# Patient Record
Sex: FEMALE | Employment: FULL TIME | URBAN - METROPOLITAN AREA
[De-identification: names, ages, dates, MRNs, and addresses within clinical notes are randomized per-mention and may not be internally consistent; named-entity substitution may affect disease eponyms.]

---

## 2022-05-18 VITALS — DIASTOLIC BLOOD PRESSURE: 60 MMHG | WEIGHT: 111 LBS | BODY MASS INDEX: 21.68 KG/M2 | SYSTOLIC BLOOD PRESSURE: 102 MMHG

## 2022-05-24 ENCOUNTER — ROUTINE PRENATAL (OUTPATIENT)
Dept: OBGYN CLINIC | Age: 32
End: 2022-05-24

## 2022-05-24 VITALS — WEIGHT: 118 LBS | BODY MASS INDEX: 23.05 KG/M2 | DIASTOLIC BLOOD PRESSURE: 70 MMHG | SYSTOLIC BLOOD PRESSURE: 102 MMHG

## 2022-05-24 DIAGNOSIS — Z34.03 PRIMIGRAVIDA IN THIRD TRIMESTER: Primary | ICD-10-CM

## 2022-05-24 PROCEDURE — 0502F SUBSEQUENT PRENATAL CARE: CPT | Performed by: OBSTETRICS & GYNECOLOGY

## 2022-06-10 ENCOUNTER — ROUTINE PRENATAL (OUTPATIENT)
Dept: OBGYN CLINIC | Age: 32
End: 2022-06-10
Payer: COMMERCIAL

## 2022-06-10 VITALS — SYSTOLIC BLOOD PRESSURE: 104 MMHG | BODY MASS INDEX: 23.63 KG/M2 | WEIGHT: 121 LBS | DIASTOLIC BLOOD PRESSURE: 60 MMHG

## 2022-06-10 DIAGNOSIS — Z34.93 ENCOUNTER FOR SUPERVISION OF LOW-RISK PREGNANCY IN THIRD TRIMESTER: ICD-10-CM

## 2022-06-10 PROCEDURE — 99213 OFFICE O/P EST LOW 20 MIN: CPT | Performed by: OBSTETRICS & GYNECOLOGY

## 2022-06-10 NOTE — PROGRESS NOTES
Elijah Chapin 1284   Denies NICOLÁS, COURTNEY, Ctxs. Good FM.       Vitals:    06/10/22 0946   BP: 104/60        Encounter for supervision of low-risk pregnancy in third trimester  Kick counts and labor precautions reviewed          Daphnie Parra DO

## 2022-06-20 ENCOUNTER — ROUTINE PRENATAL (OUTPATIENT)
Dept: OBGYN CLINIC | Age: 32
End: 2022-06-20

## 2022-06-20 VITALS — SYSTOLIC BLOOD PRESSURE: 118 MMHG | WEIGHT: 121 LBS | DIASTOLIC BLOOD PRESSURE: 60 MMHG | BODY MASS INDEX: 23.63 KG/M2

## 2022-06-20 DIAGNOSIS — Z34.93 ENCOUNTER FOR SUPERVISION OF LOW-RISK PREGNANCY IN THIRD TRIMESTER: Primary | ICD-10-CM

## 2022-06-20 PROCEDURE — 99902 PR PRENATAL VISIT: CPT | Performed by: OBSTETRICS & GYNECOLOGY

## 2022-07-06 ENCOUNTER — ROUTINE PRENATAL (OUTPATIENT)
Dept: OBGYN CLINIC | Age: 32
End: 2022-07-06

## 2022-07-06 VITALS — DIASTOLIC BLOOD PRESSURE: 70 MMHG | BODY MASS INDEX: 24.8 KG/M2 | WEIGHT: 127 LBS | SYSTOLIC BLOOD PRESSURE: 118 MMHG

## 2022-07-06 DIAGNOSIS — Z36.85 SCREENING, ANTENATAL, FOR STREPTOCOCCUS B: ICD-10-CM

## 2022-07-06 DIAGNOSIS — Z34.93 ENCOUNTER FOR SUPERVISION OF LOW-RISK PREGNANCY IN THIRD TRIMESTER: Primary | ICD-10-CM

## 2022-07-06 LAB — GBS, EXTERNAL RESULT: NEGATIVE

## 2022-07-06 PROCEDURE — 99902 PR PRENATAL VISIT: CPT | Performed by: OBSTETRICS & GYNECOLOGY

## 2022-07-06 NOTE — PROGRESS NOTES
HUI. . 36w4d   Denies LOF, VB, Ctxs. Good FM.       CBB pt    GBS today  SVE: pt defers   ceph by roldan       Tdap reminder-- pt DECLINES   Counseled

## 2022-07-11 LAB
BACTERIA SPEC CULT: NORMAL
SERVICE CMNT-IMP: NORMAL

## 2022-07-14 ENCOUNTER — ROUTINE PRENATAL (OUTPATIENT)
Dept: OBGYN CLINIC | Age: 32
End: 2022-07-14

## 2022-07-14 VITALS — WEIGHT: 128 LBS | DIASTOLIC BLOOD PRESSURE: 78 MMHG | SYSTOLIC BLOOD PRESSURE: 118 MMHG | BODY MASS INDEX: 25 KG/M2

## 2022-07-14 DIAGNOSIS — Z34.93 ENCOUNTER FOR SUPERVISION OF LOW-RISK PREGNANCY IN THIRD TRIMESTER: Primary | ICD-10-CM

## 2022-07-14 PROCEDURE — 99902 PR PRENATAL VISIT: CPT | Performed by: OBSTETRICS & GYNECOLOGY

## 2022-07-14 RX ORDER — DIAPER,BRIEF,INFANT-TODD,DISP
EACH MISCELLANEOUS
Qty: 30 G | Refills: 1 | Status: SHIPPED | OUTPATIENT
Start: 2022-07-14 | End: 2022-07-21

## 2022-07-14 NOTE — PROGRESS NOTES
30yo G1 at 37w5d for HUI:    +FM, no VB, no LOF, no persistent ctx    GBS neg    C/o hemorrhoids    Vitals:    07/14/22 1334   BP: 118/78       +++FHTs    Palpates cephalic. Routine counseling discussed. Kick counts reviewed. Labor precautions and labor preferences reviewed. RTC 1wk.

## 2022-07-20 ENCOUNTER — ROUTINE PRENATAL (OUTPATIENT)
Dept: OBGYN CLINIC | Age: 32
End: 2022-07-20

## 2022-07-20 VITALS — SYSTOLIC BLOOD PRESSURE: 122 MMHG | WEIGHT: 131 LBS | DIASTOLIC BLOOD PRESSURE: 80 MMHG | BODY MASS INDEX: 25.58 KG/M2

## 2022-07-20 DIAGNOSIS — Z34.93 ENCOUNTER FOR SUPERVISION OF LOW-RISK PREGNANCY IN THIRD TRIMESTER: Primary | ICD-10-CM

## 2022-07-20 PROCEDURE — 99902 PR PRENATAL VISIT: CPT | Performed by: OBSTETRICS & GYNECOLOGY

## 2022-07-27 ENCOUNTER — ROUTINE PRENATAL (OUTPATIENT)
Dept: OBGYN CLINIC | Age: 32
End: 2022-07-27

## 2022-07-27 VITALS — BODY MASS INDEX: 26.37 KG/M2 | WEIGHT: 135 LBS | DIASTOLIC BLOOD PRESSURE: 78 MMHG | SYSTOLIC BLOOD PRESSURE: 122 MMHG

## 2022-07-27 DIAGNOSIS — Z34.93 ENCOUNTER FOR SUPERVISION OF LOW-RISK PREGNANCY IN THIRD TRIMESTER: Primary | ICD-10-CM

## 2022-07-27 PROCEDURE — 99902 PR PRENATAL VISIT: CPT | Performed by: OBSTETRICS & GYNECOLOGY

## 2022-07-28 ENCOUNTER — CLINICAL DOCUMENTATION (OUTPATIENT)
Dept: OBGYN CLINIC | Age: 32
End: 2022-07-28

## 2022-07-28 NOTE — PROGRESS NOTES
S/LILIYA Cuellar at [facility] L&D to schedule pt's IOL with  8/1/22 with cytotec 7/31/22. [employer] message sent to pt.

## 2022-07-31 ENCOUNTER — HOSPITAL ENCOUNTER (INPATIENT)
Age: 32
LOS: 3 days | Discharge: HOME | End: 2022-08-03
Attending: OBSTETRICS & GYNECOLOGY | Admitting: OBSTETRICS & GYNECOLOGY
Payer: COMMERCIAL

## 2022-07-31 PROBLEM — Z34.90 ENCOUNTER FOR INDUCTION OF LABOR: Status: ACTIVE | Noted: 2022-07-31

## 2022-07-31 LAB
ABO + RH BLD: NORMAL
BASOPHILS # BLD: 0 K/UL (ref 0–0.2)
BASOPHILS NFR BLD: 0 % (ref 0–2)
BLOOD GROUP ANTIBODIES SERPL: NORMAL
DIFFERENTIAL METHOD BLD: ABNORMAL
EOSINOPHIL # BLD: 0.1 K/UL (ref 0–0.8)
EOSINOPHIL NFR BLD: 0 % (ref 0.5–7.8)
ERYTHROCYTE [DISTWIDTH] IN BLOOD BY AUTOMATED COUNT: 13.1 % (ref 11.9–14.6)
HCT VFR BLD AUTO: 34.1 % (ref 35.8–46.3)
HGB BLD-MCNC: 11.6 G/DL (ref 11.7–15.4)
IMM GRANULOCYTES # BLD AUTO: 0.1 K/UL (ref 0–0.5)
IMM GRANULOCYTES NFR BLD AUTO: 1 % (ref 0–5)
LYMPHOCYTES # BLD: 1.2 K/UL (ref 0.5–4.6)
LYMPHOCYTES NFR BLD: 8 % (ref 13–44)
MCH RBC QN AUTO: 31.2 PG (ref 26.1–32.9)
MCHC RBC AUTO-ENTMCNC: 34 G/DL (ref 31.4–35)
MCV RBC AUTO: 91.7 FL (ref 79.6–97.8)
MONOCYTES # BLD: 0.8 K/UL (ref 0.1–1.3)
MONOCYTES NFR BLD: 6 % (ref 4–12)
NEUTS SEG # BLD: 12.1 K/UL (ref 1.7–8.2)
NEUTS SEG NFR BLD: 85 % (ref 43–78)
NRBC # BLD: 0 K/UL (ref 0–0.2)
PLATELET # BLD AUTO: 168 K/UL (ref 150–450)
PMV BLD AUTO: 11.7 FL (ref 9.4–12.3)
RBC # BLD AUTO: 3.72 M/UL (ref 4.05–5.2)
SPECIMEN EXP DATE BLD: NORMAL
WBC # BLD AUTO: 14.2 K/UL (ref 4.3–11.1)

## 2022-07-31 PROCEDURE — 1100000000 HC RM PRIVATE

## 2022-07-31 PROCEDURE — 3E033VJ INTRODUCTION OF OTHER HORMONE INTO PERIPHERAL VEIN, PERCUTANEOUS APPROACH: ICD-10-PCS | Performed by: OBSTETRICS & GYNECOLOGY

## 2022-07-31 PROCEDURE — 86901 BLOOD TYPING SEROLOGIC RH(D): CPT

## 2022-07-31 PROCEDURE — 6370000000 HC RX 637 (ALT 250 FOR IP): Performed by: OBSTETRICS & GYNECOLOGY

## 2022-07-31 PROCEDURE — 85025 COMPLETE CBC W/AUTO DIFF WBC: CPT

## 2022-07-31 RX ORDER — ONDANSETRON 2 MG/ML
4 INJECTION INTRAMUSCULAR; INTRAVENOUS EVERY 6 HOURS PRN
Status: DISCONTINUED | OUTPATIENT
Start: 2022-07-31 | End: 2022-08-01 | Stop reason: SDUPTHER

## 2022-07-31 RX ORDER — ACETAMINOPHEN 500 MG
1000 TABLET ORAL EVERY 6 HOURS PRN
Status: DISCONTINUED | OUTPATIENT
Start: 2022-07-31 | End: 2022-08-01 | Stop reason: SDUPTHER

## 2022-07-31 RX ORDER — SODIUM CHLORIDE 9 MG/ML
25 INJECTION, SOLUTION INTRAVENOUS PRN
Status: DISCONTINUED | OUTPATIENT
Start: 2022-07-31 | End: 2022-08-01 | Stop reason: SDUPTHER

## 2022-07-31 RX ORDER — MISOPROSTOL 200 UG/1
800 TABLET ORAL PRN
Status: DISCONTINUED | OUTPATIENT
Start: 2022-07-31 | End: 2022-08-03 | Stop reason: HOSPADM

## 2022-07-31 RX ORDER — TRANEXAMIC ACID 10 MG/ML
1000 INJECTION, SOLUTION INTRAVENOUS
Status: ACTIVE | OUTPATIENT
Start: 2022-07-31 | End: 2022-07-31

## 2022-07-31 RX ORDER — TERBUTALINE SULFATE 1 MG/ML
0.25 INJECTION, SOLUTION SUBCUTANEOUS ONCE
Status: DISCONTINUED | OUTPATIENT
Start: 2022-07-31 | End: 2022-08-03 | Stop reason: ALTCHOICE

## 2022-07-31 RX ORDER — SODIUM CHLORIDE 0.9 % (FLUSH) 0.9 %
5-40 SYRINGE (ML) INJECTION PRN
Status: DISCONTINUED | OUTPATIENT
Start: 2022-07-31 | End: 2022-08-01 | Stop reason: SDUPTHER

## 2022-07-31 RX ORDER — ZOLPIDEM TARTRATE 5 MG/1
5 TABLET ORAL NIGHTLY PRN
Status: DISCONTINUED | OUTPATIENT
Start: 2022-07-31 | End: 2022-08-03 | Stop reason: HOSPADM

## 2022-07-31 RX ORDER — CARBOPROST TROMETHAMINE 250 UG/ML
250 INJECTION, SOLUTION INTRAMUSCULAR PRN
Status: DISCONTINUED | OUTPATIENT
Start: 2022-07-31 | End: 2022-08-03 | Stop reason: HOSPADM

## 2022-07-31 RX ORDER — CLINDAMYCIN PHOSPHATE 900 MG/50ML
900 INJECTION INTRAVENOUS EVERY 8 HOURS
Status: CANCELLED | OUTPATIENT
Start: 2022-07-31

## 2022-07-31 RX ORDER — SODIUM CHLORIDE 0.9 % (FLUSH) 0.9 %
5-40 SYRINGE (ML) INJECTION EVERY 12 HOURS SCHEDULED
Status: DISCONTINUED | OUTPATIENT
Start: 2022-07-31 | End: 2022-08-01 | Stop reason: SDUPTHER

## 2022-07-31 RX ORDER — SODIUM CHLORIDE, SODIUM LACTATE, POTASSIUM CHLORIDE, CALCIUM CHLORIDE 600; 310; 30; 20 MG/100ML; MG/100ML; MG/100ML; MG/100ML
INJECTION, SOLUTION INTRAVENOUS CONTINUOUS
Status: DISCONTINUED | OUTPATIENT
Start: 2022-07-31 | End: 2022-08-01 | Stop reason: SDUPTHER

## 2022-07-31 RX ORDER — SODIUM CHLORIDE, SODIUM LACTATE, POTASSIUM CHLORIDE, AND CALCIUM CHLORIDE .6; .31; .03; .02 G/100ML; G/100ML; G/100ML; G/100ML
500 INJECTION, SOLUTION INTRAVENOUS PRN
Status: DISCONTINUED | OUTPATIENT
Start: 2022-07-31 | End: 2022-08-01 | Stop reason: SDUPTHER

## 2022-07-31 RX ORDER — METHYLERGONOVINE MALEATE 0.2 MG/ML
200 INJECTION INTRAVENOUS PRN
Status: DISCONTINUED | OUTPATIENT
Start: 2022-07-31 | End: 2022-08-03 | Stop reason: HOSPADM

## 2022-07-31 RX ORDER — SODIUM CHLORIDE, SODIUM LACTATE, POTASSIUM CHLORIDE, AND CALCIUM CHLORIDE .6; .31; .03; .02 G/100ML; G/100ML; G/100ML; G/100ML
1000 INJECTION, SOLUTION INTRAVENOUS PRN
Status: DISCONTINUED | OUTPATIENT
Start: 2022-07-31 | End: 2022-08-01 | Stop reason: SDUPTHER

## 2022-07-31 RX ADMIN — Medication 50 MCG: at 20:20

## 2022-07-31 ASSESSMENT — PATIENT HEALTH QUESTIONNAIRE - PHQ9
DEPRESSION UNABLE TO ASSESS: YES
SUM OF ALL RESPONSES TO PHQ9 QUESTIONS 1 & 2: 0
1. LITTLE INTEREST OR PLEASURE IN DOING THINGS: NOT AT ALL
2. FEELING DOWN, DEPRESSED OR HOPELESS: NOT AT ALL

## 2022-08-01 ENCOUNTER — ANESTHESIA (OUTPATIENT)
Dept: LABOR AND DELIVERY | Age: 32
End: 2022-08-01
Payer: COMMERCIAL

## 2022-08-01 ENCOUNTER — ANESTHESIA EVENT (OUTPATIENT)
Dept: LABOR AND DELIVERY | Age: 32
End: 2022-08-01
Payer: COMMERCIAL

## 2022-08-01 LAB
BASE DEFICIT BLD-SCNC: 6 MMOL/L
BASE DEFICIT BLD-SCNC: 6.1 MMOL/L
HCO3 BLD-SCNC: 21.8 MMOL/L (ref 22–26)
HCO3 BLDV-SCNC: 19.3 MMOL/L (ref 23–28)
PCO2 BLDCO: 37 MMHG (ref 32–68)
PCO2 BLDCO: 51 MMHG (ref 32–68)
PH BLDCO: 7.24 [PH] (ref 7.15–7.38)
PH BLDCO: 7.32 [PH] (ref 7.15–7.38)
PO2 BLDCO: 19 MMHG
PO2 BLDCO: 31 MMHG
SAO2 % BLD: 22 % (ref 95–98)
SAO2 % BLDV: 54 % (ref 65–88)
SERVICE CMNT-IMP: ABNORMAL
SERVICE CMNT-IMP: ABNORMAL
SPECIMEN TYPE: ABNORMAL
SPECIMEN TYPE: ABNORMAL

## 2022-08-01 PROCEDURE — 00HU33Z INSERTION OF INFUSION DEVICE INTO SPINAL CANAL, PERCUTANEOUS APPROACH: ICD-10-PCS | Performed by: ANESTHESIOLOGY

## 2022-08-01 PROCEDURE — 6360000002 HC RX W HCPCS: Performed by: ANESTHESIOLOGY

## 2022-08-01 PROCEDURE — 59400 OBSTETRICAL CARE: CPT | Performed by: OBSTETRICS & GYNECOLOGY

## 2022-08-01 PROCEDURE — 2500000003 HC RX 250 WO HCPCS: Performed by: ANESTHESIOLOGY

## 2022-08-01 PROCEDURE — 7210000100 HC LABOR FEE PER 1 HR

## 2022-08-01 PROCEDURE — 6370000000 HC RX 637 (ALT 250 FOR IP): Performed by: OBSTETRICS & GYNECOLOGY

## 2022-08-01 PROCEDURE — 6360000002 HC RX W HCPCS: Performed by: NURSE ANESTHETIST, CERTIFIED REGISTERED

## 2022-08-01 PROCEDURE — 2580000003 HC RX 258: Performed by: OBSTETRICS & GYNECOLOGY

## 2022-08-01 PROCEDURE — 6360000002 HC RX W HCPCS: Performed by: OBSTETRICS & GYNECOLOGY

## 2022-08-01 PROCEDURE — 7220000101 HC DELIVERY VAGINAL/SINGLE

## 2022-08-01 PROCEDURE — 1100000000 HC RM PRIVATE

## 2022-08-01 PROCEDURE — 3700000025 EPIDURAL BLOCK: Performed by: ANESTHESIOLOGY

## 2022-08-01 PROCEDURE — 7100000010 HC PHASE II RECOVERY - FIRST 15 MIN

## 2022-08-01 PROCEDURE — 51701 INSERT BLADDER CATHETER: CPT

## 2022-08-01 PROCEDURE — 7100000011 HC PHASE II RECOVERY - ADDTL 15 MIN

## 2022-08-01 PROCEDURE — 36600 WITHDRAWAL OF ARTERIAL BLOOD: CPT

## 2022-08-01 PROCEDURE — 82803 BLOOD GASES ANY COMBINATION: CPT

## 2022-08-01 PROCEDURE — 4A1HXCZ MONITORING OF PRODUCTS OF CONCEPTION, CARDIAC RATE, EXTERNAL APPROACH: ICD-10-PCS | Performed by: OBSTETRICS & GYNECOLOGY

## 2022-08-01 RX ORDER — SODIUM CHLORIDE, SODIUM LACTATE, POTASSIUM CHLORIDE, CALCIUM CHLORIDE 600; 310; 30; 20 MG/100ML; MG/100ML; MG/100ML; MG/100ML
INJECTION, SOLUTION INTRAVENOUS CONTINUOUS
Status: DISCONTINUED | OUTPATIENT
Start: 2022-08-01 | End: 2022-08-03 | Stop reason: ALTCHOICE

## 2022-08-01 RX ORDER — DOCUSATE SODIUM 100 MG/1
100 CAPSULE, LIQUID FILLED ORAL 2 TIMES DAILY
Status: DISCONTINUED | OUTPATIENT
Start: 2022-08-01 | End: 2022-08-03 | Stop reason: HOSPADM

## 2022-08-01 RX ORDER — SODIUM CHLORIDE 0.9 % (FLUSH) 0.9 %
5-40 SYRINGE (ML) INJECTION EVERY 12 HOURS SCHEDULED
Status: DISCONTINUED | OUTPATIENT
Start: 2022-08-01 | End: 2022-08-01 | Stop reason: SDUPTHER

## 2022-08-01 RX ORDER — FENTANYL CITRATE 50 UG/ML
INJECTION, SOLUTION INTRAMUSCULAR; INTRAVENOUS PRN
Status: DISCONTINUED | OUTPATIENT
Start: 2022-08-01 | End: 2022-08-01 | Stop reason: SDUPTHER

## 2022-08-01 RX ORDER — ROPIVACAINE HYDROCHLORIDE 5 MG/ML
INJECTION, SOLUTION EPIDURAL; INFILTRATION; PERINEURAL PRN
Status: DISCONTINUED | OUTPATIENT
Start: 2022-08-01 | End: 2022-08-01 | Stop reason: SDUPTHER

## 2022-08-01 RX ORDER — SODIUM CHLORIDE 0.9 % (FLUSH) 0.9 %
5-40 SYRINGE (ML) INJECTION PRN
Status: DISCONTINUED | OUTPATIENT
Start: 2022-08-01 | End: 2022-08-01 | Stop reason: SDUPTHER

## 2022-08-01 RX ORDER — POLYETHYLENE GLYCOL 3350 17 G/17G
17 POWDER, FOR SOLUTION ORAL DAILY
Status: DISCONTINUED | OUTPATIENT
Start: 2022-08-02 | End: 2022-08-03 | Stop reason: HOSPADM

## 2022-08-01 RX ORDER — LIDOCAINE HYDROCHLORIDE 10 MG/ML
1 INJECTION, SOLUTION INFILTRATION; PERINEURAL
Status: DISCONTINUED | OUTPATIENT
Start: 2022-08-01 | End: 2022-08-01 | Stop reason: HOSPADM

## 2022-08-01 RX ORDER — SODIUM CHLORIDE 0.9 % (FLUSH) 0.9 %
5-40 SYRINGE (ML) INJECTION EVERY 12 HOURS SCHEDULED
Status: DISCONTINUED | OUTPATIENT
Start: 2022-08-01 | End: 2022-08-03 | Stop reason: ALTCHOICE

## 2022-08-01 RX ORDER — HYDROCORTISONE ACETATE PRAMOXINE HCL 2.5; 1 G/100G; G/100G
CREAM TOPICAL
Status: DISCONTINUED | OUTPATIENT
Start: 2022-08-01 | End: 2022-08-03 | Stop reason: HOSPADM

## 2022-08-01 RX ORDER — SIMETHICONE 80 MG
80 TABLET,CHEWABLE ORAL EVERY 6 HOURS PRN
Status: DISCONTINUED | OUTPATIENT
Start: 2022-08-01 | End: 2022-08-03 | Stop reason: HOSPADM

## 2022-08-01 RX ORDER — FAMOTIDINE 20 MG/1
20 TABLET, FILM COATED ORAL ONCE
Status: DISCONTINUED | OUTPATIENT
Start: 2022-08-01 | End: 2022-08-01 | Stop reason: HOSPADM

## 2022-08-01 RX ORDER — OXYCODONE HYDROCHLORIDE 5 MG/1
5 TABLET ORAL EVERY 4 HOURS PRN
Status: DISCONTINUED | OUTPATIENT
Start: 2022-08-01 | End: 2022-08-03 | Stop reason: HOSPADM

## 2022-08-01 RX ORDER — OXYCODONE HYDROCHLORIDE 5 MG/1
10 TABLET ORAL EVERY 4 HOURS PRN
Status: DISCONTINUED | OUTPATIENT
Start: 2022-08-01 | End: 2022-08-03 | Stop reason: HOSPADM

## 2022-08-01 RX ORDER — SODIUM CHLORIDE 9 MG/ML
INJECTION, SOLUTION INTRAVENOUS PRN
Status: DISCONTINUED | OUTPATIENT
Start: 2022-08-01 | End: 2022-08-01 | Stop reason: SDUPTHER

## 2022-08-01 RX ORDER — LIDOCAINE HYDROCHLORIDE AND EPINEPHRINE 15; 5 MG/ML; UG/ML
INJECTION, SOLUTION EPIDURAL PRN
Status: DISCONTINUED | OUTPATIENT
Start: 2022-08-01 | End: 2022-08-01 | Stop reason: SDUPTHER

## 2022-08-01 RX ORDER — IBUPROFEN 800 MG/1
800 TABLET ORAL EVERY 6 HOURS
Status: DISCONTINUED | OUTPATIENT
Start: 2022-08-01 | End: 2022-08-03 | Stop reason: HOSPADM

## 2022-08-01 RX ORDER — ONDANSETRON 8 MG/1
8 TABLET, ORALLY DISINTEGRATING ORAL EVERY 8 HOURS PRN
Status: DISCONTINUED | OUTPATIENT
Start: 2022-08-01 | End: 2022-08-03 | Stop reason: HOSPADM

## 2022-08-01 RX ORDER — MAGNESIUM CARB/ALUMINUM HYDROX 105-160MG
120 TABLET,CHEWABLE ORAL DAILY PRN
Status: DISCONTINUED | OUTPATIENT
Start: 2022-08-01 | End: 2022-08-03 | Stop reason: ALTCHOICE

## 2022-08-01 RX ORDER — ACETAMINOPHEN 500 MG
1000 TABLET ORAL EVERY 6 HOURS PRN
Status: DISCONTINUED | OUTPATIENT
Start: 2022-08-02 | End: 2022-08-03 | Stop reason: HOSPADM

## 2022-08-01 RX ORDER — SODIUM CHLORIDE 0.9 % (FLUSH) 0.9 %
5-40 SYRINGE (ML) INJECTION PRN
Status: DISCONTINUED | OUTPATIENT
Start: 2022-08-01 | End: 2022-08-03 | Stop reason: ALTCHOICE

## 2022-08-01 RX ORDER — SODIUM CHLORIDE, SODIUM LACTATE, POTASSIUM CHLORIDE, CALCIUM CHLORIDE 600; 310; 30; 20 MG/100ML; MG/100ML; MG/100ML; MG/100ML
INJECTION, SOLUTION INTRAVENOUS CONTINUOUS
Status: DISCONTINUED | OUTPATIENT
Start: 2022-08-01 | End: 2022-08-01 | Stop reason: HOSPADM

## 2022-08-01 RX ORDER — LANOLIN
CREAM (ML) TOPICAL PRN
Status: DISCONTINUED | OUTPATIENT
Start: 2022-08-01 | End: 2022-08-03 | Stop reason: HOSPADM

## 2022-08-01 RX ORDER — SODIUM CHLORIDE 9 MG/ML
INJECTION, SOLUTION INTRAVENOUS PRN
Status: DISCONTINUED | OUTPATIENT
Start: 2022-08-01 | End: 2022-08-03 | Stop reason: ALTCHOICE

## 2022-08-01 RX ORDER — ROPIVACAINE HYDROCHLORIDE 2 MG/ML
INJECTION, SOLUTION EPIDURAL; INFILTRATION; PERINEURAL PRN
Status: DISCONTINUED | OUTPATIENT
Start: 2022-08-01 | End: 2022-08-01 | Stop reason: SDUPTHER

## 2022-08-01 RX ADMIN — Medication 87.3 MILLI-UNITS/MIN: at 18:53

## 2022-08-01 RX ADMIN — SODIUM CHLORIDE, POTASSIUM CHLORIDE, SODIUM LACTATE AND CALCIUM CHLORIDE 1000 ML: 600; 310; 30; 20 INJECTION, SOLUTION INTRAVENOUS at 07:33

## 2022-08-01 RX ADMIN — ROPIVACAINE HYDROCHLORIDE 4 ML: 2 INJECTION, SOLUTION EPIDURAL; INFILTRATION at 08:17

## 2022-08-01 RX ADMIN — Medication 120 ML: at 18:37

## 2022-08-01 RX ADMIN — ROPIVACAINE HYDROCHLORIDE 8 ML/HR: 2 INJECTION, SOLUTION EPIDURAL; INFILTRATION at 08:18

## 2022-08-01 RX ADMIN — ACETAMINOPHEN 1000 MG: 500 TABLET, FILM COATED ORAL at 13:21

## 2022-08-01 RX ADMIN — IBUPROFEN 800 MG: 800 TABLET, FILM COATED ORAL at 23:45

## 2022-08-01 RX ADMIN — Medication 166.7 ML: at 18:42

## 2022-08-01 RX ADMIN — HYDROCORTISONE ACETATE PRAMOXINE HCL: 2.5; 1 CREAM TOPICAL at 23:45

## 2022-08-01 RX ADMIN — DOCUSATE SODIUM 100 MG: 100 CAPSULE ORAL at 23:45

## 2022-08-01 RX ADMIN — Medication 87.3 MILLI-UNITS/MIN: at 19:10

## 2022-08-01 RX ADMIN — ACETAMINOPHEN 1000 MG: 500 TABLET, FILM COATED ORAL at 19:29

## 2022-08-01 RX ADMIN — FENTANYL CITRATE 100 MCG: 50 INJECTION INTRAMUSCULAR; INTRAVENOUS at 13:29

## 2022-08-01 RX ADMIN — SODIUM CHLORIDE, PRESERVATIVE FREE 10 ML: 5 INJECTION INTRAVENOUS at 04:15

## 2022-08-01 RX ADMIN — SODIUM CHLORIDE, POTASSIUM CHLORIDE, SODIUM LACTATE AND CALCIUM CHLORIDE 500 ML: 600; 310; 30; 20 INJECTION, SOLUTION INTRAVENOUS at 08:13

## 2022-08-01 RX ADMIN — ROPIVACAINE HYDROCHLORIDE 5 ML: 5 INJECTION, SOLUTION EPIDURAL; INFILTRATION; PERINEURAL at 13:32

## 2022-08-01 RX ADMIN — ROPIVACAINE HYDROCHLORIDE 4 ML: 2 INJECTION, SOLUTION EPIDURAL; INFILTRATION at 08:15

## 2022-08-01 RX ADMIN — Medication 2 MILLI-UNITS/MIN: at 08:44

## 2022-08-01 RX ADMIN — LIDOCAINE HYDROCHLORIDE,EPINEPHRINE BITARTRATE 3.5 ML: 15; .005 INJECTION, SOLUTION EPIDURAL; INFILTRATION; INTRACAUDAL; PERINEURAL at 08:12

## 2022-08-01 RX ADMIN — ONDANSETRON 4 MG: 2 INJECTION INTRAMUSCULAR; INTRAVENOUS at 04:15

## 2022-08-01 RX ADMIN — BUTORPHANOL TARTRATE 1 MG: 2 INJECTION, SOLUTION INTRAMUSCULAR; INTRAVENOUS at 07:33

## 2022-08-01 RX ADMIN — Medication: at 23:46

## 2022-08-01 RX ADMIN — WITCH HAZEL 40 EACH: 500 SOLUTION RECTAL; TOPICAL at 23:45

## 2022-08-01 RX ADMIN — BUTORPHANOL TARTRATE 1 MG: 2 INJECTION, SOLUTION INTRAMUSCULAR; INTRAVENOUS at 04:13

## 2022-08-01 ASSESSMENT — PAIN SCALES - GENERAL
PAINLEVEL_OUTOF10: 7
PAINLEVEL_OUTOF10: 3
PAINLEVEL_OUTOF10: 0
PAINLEVEL_OUTOF10: 2

## 2022-08-01 ASSESSMENT — PAIN DESCRIPTION - DESCRIPTORS
DESCRIPTORS: CRAMPING
DESCRIPTORS: PRESSURE

## 2022-08-01 ASSESSMENT — PAIN DESCRIPTION - LOCATION
LOCATION: PERINEUM
LOCATION: ABDOMEN

## 2022-08-01 ASSESSMENT — PAIN SCALES - WONG BAKER
WONGBAKER_NUMERICALRESPONSE: 0
WONGBAKER_NUMERICALRESPONSE: 0

## 2022-08-01 ASSESSMENT — PAIN DESCRIPTION - ORIENTATION: ORIENTATION: LOWER

## 2022-08-01 NOTE — L&D DELIVERY NOTE
Delivery Note    Obstetrician:  Sonia Sevilla MD    Pre-Delivery Diagnosis: Term pregnancy, Induced labor, Single fetus, and Uncomplicated pregnancy    Post-Delivery Diagnosis: Living  infant(s) and Male    Intrapartum Event: Meconium    Procedure: Spontaneous vaginal delivery    Epidural: Yes    Monitor:  Fetal Heart Tones - External and Uterine Contractions - External    Indications for instrumental delivery: none    Estimated Blood Loss: 250    Episiotomy: R mediolateral (the patient's right)    Laceration(s):  left sulcal    Laceration(s) repair: Yes    Presentation: Cephalic    Fetal Description: pires    Fetal Position: Left Occiput Anterior    BABY INFORMATION  NAME:   Information for the patient's :  Chad Hayden [[id number]]   Baby Boy Suly Ramos   SEX: female  DATE AND TIME OF BIRTH:   Information for the patient's :  Chad Hayden [[id number]]   2024  at   Information for the patient's :  Chad Hayden [[id number]]   [facility]:   Information for the patient's :  Chad Jackelyn [[id number]]     and   Information for the patient's :  Chad Jackelyn [[id number]]    . APGARS:  Information for the patient's :  Chad Hayden [[id number]]       Information for the patient's :  Chad Hayden [[id number]]        Umbilical Cord: 3 vessels present and Cord blood sent to lab for type, Rh, and Sammy' test    Specimens: cord gases were drawn and sent           Complications:  Shoulder dystocia lasting 80 seconds total.           Lab Results   Component Value Date/Time    ABORH B POSITIVE 2022 07:11 PM    ABSCRNEXT Negative 2022 10:25 AM    RUBELLAEXT Immune 2022 10:25 AM            Attending Attestation: I performed the procedure.  Was already scrubbed in room pushing with the patient, NICU and RT called to room for delivery with prolonged pushing Details:            [place] Presentation    Presentation: Vertex  Position: Left  _: Occiput  _: Anterior       Shoulder Dystocia    Shoulder Dystocia Present?: Yes  Anterior Shoulder: Right    Time Recognized: 2022 18:36:00    Time Help Called: 2022 18:36:00   Physician/Provider Arrived: 2022 18:25:00    Team Arrived: 2022 18:30:00   Additional Staff Arrived: 2022 18:36:00   Gentle Attempt at Traction, Assisted by Maternal Expulsive Forces?: Yes  First Maneuver: Head of Bed Down, Delivery of Posterior Arm, Alison Maneuver, Suprapubic Pressure   Time Performed: 2022 18:36:00    Performed By: Dr. Devin Crisostomo       Assisted Delivery Details    Forceps Attempted?: No  Vacuum Extractor Attempted?: No       Document Additional Attempt         Document Additional Attempt                 Cord    Vessels: 3 Vessels  Complications: None  Delayed Cord Clamping?: No  Cord Clamped Date/Time: 2022 18:38:00  Cord Blood Disposition: Lab  Gases Sent?: Yes       Placenta    Date/Time: 2022 18:42:00  Removal: Spontaneous  Appearance: Intact  Disposition: Discarded       Lacerations    Episiotomy: Right Mediolateral  Perineal Lacerations: 2nd  Other Lacerations: sulcus laceration  Sulcus Laceration: left Repaired?: Yes   Number of Repair Packets: 4       Vaginal Counts    Initial Count Personnel: Kamron LOPEZ  Initial Count Verified By: Matty Lin RN    Sponges [place] Instruments   Initial Counts Correct Correct Correct   Final Counts Correct Correct Correct   Final Count Personnel: Bello Oliva  Final Count Verified By: Matty Lin RN  Accurate Final Count?: Yes  If the count is incorrect due to Intentionally Retained Foreign Object (IRFO) add the IRFO LDA in Lines/Drains.   Add LDA: Link to LDA       Blood Loss  Mother: Kameron Yoder #[id number]     Start of Mother's Information      Delivery Blood Loss  22 2020 - 22 1916      Quantitative Blood Loss (mL) Hospital Encounter 250 grams    Total  250 mL               End of Mother's Information  Mother: Kameron Yoder #[id number]                Delivery Providers    Delivering clinician: Talib Welsh MD     Provider Role    Talib Welsh MD Obstetrician    Paty Rubio, RN Primary Nurse    Jose Miguel Gomez RN Primary  Nurse    Mehreen Burgos RN Primary  Nurse    Monse Young MD Neonatologist     Respiratory Therapist (Day)              [place] Assessment    Living Status: Living     Apgar Scoring Key:    0 1 2    Skin Color: Blue or pale Acrocyanotic Completely pink    Heart Rate: Absent <100 bpm >100 bpm    Reflex Irritability: No response Grimace Cry or active withdrawal    Muscle Tone: Limp Some flexion Active motion    Respiratory Effort: Absent Weak cry; hypoventilation Good, crying                      Skin Color:   Heart Rate:   Reflex Irritability:   Muscle Tone:   Respiratory Effort: Total:            1 Minute:    0    1    1    1    2    5        Apgar 1 total from OB History    5 Minute:    1    2    2    2    2    9        Apgar 5 total from OB History    10 Minute:              15 Minute:              20 Minute:                        Apgars Assigned By: DR. XIE              Resuscitation    Method: Bulb Suction, Stimulation              Measurements      Birth Weight: 3880 g Birth Length: 0.52 m     Head Circumference: 0.365 m Chest Circumference: 0.35 m              Title      Skin to Skin Initiation Date/Time: 22 18:58:36 EDT     Skin to Skin With: Mother     Skin to Skin End Date/Time:       Reason Skin to Skin Not Initiated:  Acuity

## 2022-08-01 NOTE — H&P
History & Physical    Name: Chris Hernandez MRN: [id number]  SSN: [id number]    YOB: 1990  Age: 33 y.o. Sex: female      Subjective:     Estimated Date of Delivery: 22  OB History    Para Term  AB Living   1             SAB IAB Ectopic Molar Multiple Live Births                    # Outcome Date GA Lbr Salvador/2nd Weight Sex Delivery Anes PTL Lv   1 Current                Ms. Shalom Carreno is admitted with pregnancy at 40w2d for induction of labor due to SGA fetus at term. Prenatal course was complicated by  close monitoring for IUGR . Please see prenatal records for details. History reviewed. No pertinent past medical history. History reviewed. No pertinent surgical history. Social History     Occupational History    Not on file   Tobacco Use    Smoking status: Never     Passive exposure: Never    Smokeless tobacco: Never   Substance and Sexual Activity    Alcohol use: Never    Drug use: Never    Sexual activity: Not on file     History reviewed. No pertinent family history. Allergies   Allergen Reactions    Strawberry Extract Rash     Prior to Admission medications    Medication Sig Start Date End Date Taking? Authorizing Provider   Prenatal Vit-Fe Fumarate-FA (PRENATAL PO) Take by mouth    Historical Provider, MD        Review of Systems:   Pertinent ROS noted in the HPI.     Objective:     Vitals:  Vitals:    22 0845 22 0859 22 0915 22 0959   BP: (!) 104/57 105/60 (!) 111/58 118/60   Pulse: 96 93 94 (!) 102   Resp:       Temp:       TempSrc:            Physical Exam:  Constitutional: no distress, AAO x3  Heart: RRR  Lungs: CTAB  Abdomen: soft, nontender, gravid  LE: No swelling  SVE: 5/100/0 per RN check @ 0845  Membranes:   SROM'd overnight  Fetal Heart Rate: Reactive          Prenatal Labs:   Lab Results   Component Value Date/Time    ABORH B POSITIVE 2022 07:11 PM    RUBELLAEXT Immune 2022 10:25 AM    HBSAGEXT Negative 2022 10:33 AM HIVEXT Non reactive 03/24/2022 10:33 AM    RPREXT Negative 03/24/2022 10:33 AM       Impression/Plan:     Principal Problem:    Encounter for induction of labor  Resolved Problems:    * No resolved hospital problems. *       Plan: Admit for induction of labor. Group B Strep negative. IOL Counseling:     Factual information regarding the medical condition and the need for induction of labor was discussed with the patient, who verbalized understanding. The therapeutic rationale, benefits, risks and potential complications were discussed, including the possibility of pain, bleeding, infection, loss of function, disfigurement, death or other unforeseen complications. Alternatives to the procedure were discussed (including potential risks, complications and benefits of each). No guarantee was expressed or implied as to the results of the procedure. Informed consent was given, as well as a request to proceed.

## 2022-08-01 NOTE — ANESTHESIA PROCEDURE NOTES
Epidural Block    Patient location during procedure: OB  Start time: 8/1/2022 8:07 AM  End time: 8/1/2022 8:12 AM  Reason for block: labor epidural  Staffing  Performed: anesthesiologist   Anesthesiologist: Sarbjit Stout MD  Epidural  Patient position: sitting  Prep: ChloraPrep and site prepped and draped  Patient monitoring: continuous pulse ox and frequent blood pressure checks  Approach: midline  Location: L3-4  Injection technique: BOGDAN saline  Provider prep: mask and sterile gloves  Needle  Needle type: Tuohy   Needle gauge: 17 G  Needle length: 3.5 in  Needle insertion depth: 6 cm  Catheter type: end hole  Catheter size: 19 G  Catheter at skin depth: 12 cm  Test dose: negativeCatheter Secured: tegaderm and tape  Assessment  Hemodynamics: stable  Attempts: 1  Outcomes: patient tolerated procedure well  Preanesthetic Checklist  Completed: patient identified, IV checked, risks and benefits discussed, equipment checked, pre-op evaluation, timeout performed, anesthesia consent given, oxygen available and monitors applied/VS acknowledged

## 2022-08-01 NOTE — PROGRESS NOTES
notified of SVE= 4/70/-2, SROM at 0530 with [facility] and request for Epidural. Orders received for Epidural

## 2022-08-01 NOTE — ANESTHESIA PRE PROCEDURE
Department of Anesthesiology  Preprocedure Note       Name:  Flakito Oden   Age:  33 y.o.  :  1990                                          MRN:  [id number]         Date:  2022      Surgeon: * No surgeons listed *    Procedure: * No procedures listed *    Medications prior to admission:   Prior to Admission medications    Medication Sig Start Date End Date Taking?  Authorizing Provider   Prenatal Vit-Fe Fumarate-FA (PRENATAL PO) Take by mouth    Historical Provider, MD       Current medications:    Current Facility-Administered Medications   Medication Dose Route Frequency Provider Last Rate Last Admin    lidocaine 1 % injection 1 mL  1 mL IntraDERmal Once PRN CARLOS Crookal LeakMD yoselin        famotidine (PEPCID) tablet 20 mg  20 mg Oral Once L Sancho Leaks, MD        lactated ringers infusion   IntraVENous Continuous CARLOS Sancho LeakMD yoselin        sodium chloride flush 0.9 % injection 5-40 mL  5-40 mL IntraVENous 2 times per day CARLOS Sancho Leaks, MD        sodium chloride flush 0.9 % injection 5-40 mL  5-40 mL IntraVENous PRN CARLOS Sancho LeakMD yoselin        0.9 % sodium chloride infusion   IntraVENous PRN L Sancho Leaks, MD        mineral oil liquid 120 mL  120 mL Topical Daily PRN Shirley Mchugh MD        lactated ringers infusion   IntraVENous Continuous Shirley Mchugh MD        lactated ringers bolus  500 mL IntraVENous PRN Shirley Mchugh .8 mL/hr at 22 0813 500 mL at 22 0813    Or    lactated ringers bolus  1,000 mL IntraVENous PRN Shirley Mchugh MD   Stopped at 22 0801    sodium chloride flush 0.9 % injection 5-40 mL  5-40 mL IntraVENous 2 times per day Shirley cMhugh MD        sodium chloride flush 0.9 % injection 5-40 mL  5-40 mL IntraVENous PRN Shirley Mchugh MD   10 mL at 22 0415    0.9 % sodium chloride infusion  25 mL IntraVENous PRN Shirley Mchugh MD        oxytocin (PITOCIN) 30 units in 500 mL infusion  1-20 griffin-units/min IntraVENous Continuous Shelley Hannah MD   Stopped at 08/01/22 1200    methylergonovine (METHERGINE) injection 200 mcg  200 mcg IntraMUSCular PRN Shelley Hannah MD        carboprost (HEMABATE) injection 250 mcg  250 mcg IntraMUSCular PRN Shelley Hannah MD        miSOPROStol (CYTOTEC) tablet 800 mcg  800 mcg Rectal PRN Shelley Hannah MD        oxytocin (PITOCIN) 30 units in 500 mL infusion  87.3 griffin-units/min IntraVENous Continuous PRN Shelley Hannah MD        And    oxytocin (PITOCIN) 10 unit bolus from the bag  10 Units IntraVENous PRN Shelley Hannah MD        terbutaline (BRETHINE) injection 0.25 mg  0.25 mg SubCUTAneous Once Shelley Hannah MD        ondansetron [facility]) injection 4 mg  4 mg IntraVENous Q6H PRN Shelley Hannah MD   4 mg at 08/01/22 0415    zolpidem (AMBIEN) tablet 5 mg  5 mg Oral Nightly PRN Shelley Hannah MD        acetaminophen (TYLENOL) tablet 1,000 mg  1,000 mg Oral Q6H PRN Shelley Hannah MD         Facility-Administered Medications Ordered in Other Encounters   Medication Dose Route Frequency Provider Last Rate Last Admin    ropivacaine (NAROPIN) 0.2% injection 0.2%   Epidural PRN CARLOS Currie MD   8 mL/hr at 08/01/22 0818    Lidocaine-EPINEPHrine 1.5 %-1:[id number]   Epidural PRN CARLOS Currie MD   3.5 mL at 08/01/22 6375       Allergies: Allergies   Allergen Reactions    Strawberry Extract Rash       Problem List:    Patient Active Problem List   Diagnosis Code    Encounter for supervision of low-risk pregnancy in third trimester Z34.93    Encounter for induction of labor Z34.90       Past Medical History:  History reviewed. No pertinent past medical history. Past Surgical History:  History reviewed. No pertinent surgical history.     Social History:    Social History     Tobacco Use    Smoking status: Never     Passive exposure: Never    Smokeless tobacco: Never   Substance Use Topics    Alcohol use: Never Counseling given: Not Answered      Vital Signs (Current):   Vitals:    08/01/22 1129 08/01/22 1145 08/01/22 1157 08/01/22 1200   BP: (!) 107/59 106/66  119/77   Pulse: 92 90  (!) 102   Resp:       Temp:   99.7 °F (37.6 °C)    TempSrc:   Oral    SpO2:                                                  BP Readings from Last 3 Encounters:   08/01/22 119/77   07/27/22 122/78   07/20/22 122/80       NPO Status:                                                                                 BMI:   Wt Readings from Last 3 Encounters:   07/27/22 135 lb (61.2 kg)   07/20/22 131 lb (59.4 kg)   07/14/22 128 lb (58.1 kg)     There is no height or weight on file to calculate BMI.    CBC:   Lab Results   Component Value Date/Time    WBC 14.2 07/31/2022 07:11 PM    RBC 3.72 07/31/2022 07:11 PM    HGB 11.6 07/31/2022 07:11 PM    HCT 34.1 07/31/2022 07:11 PM    MCV 91.7 07/31/2022 07:11 PM    RDW 13.1 07/31/2022 07:11 PM     07/31/2022 07:11 PM       CMP: No results found for: NA, K, CL, CO2, BUN, CREATININE, GFRAA, AGRATIO, LABGLOM, GLUCOSE, GLU, PROT, CALCIUM, BILITOT, ALKPHOS, AST, ALT    POC Tests: No results for input(s): POCGLU, POCNA, POCK, POCCL, POCBUN, POCHEMO, POCHCT in the last 72 hours.     Coags: No results found for: PROTIME, INR, APTT    HCG (If Applicable): No results found for: PREGTESTUR, PREGSERUM, HCG, HCGQUANT     ABGs: No results found for: PHART, PO2ART, KYE9YHP, TKU7XIL, BEART, G5AWVLHV     Type & Screen (If Applicable):  No results found for: LABABO, LABRH    Drug/Infectious Status (If Applicable):  No results found for: HIV, HEPCAB    COVID-19 Screening (If Applicable): No results found for: COVID19        Anesthesia Evaluation  Patient summary reviewed and Nursing notes reviewed  Airway: Mallampati: II  TM distance: >3 FB   Neck ROM: full  Mouth opening: > = 3 FB   Dental: normal exam         Pulmonary:Negative Pulmonary ROS                              Cardiovascular:Negative CV ROS  Exercise tolerance: good (>4 METS),                     Neuro/Psych:   Negative Neuro/Psych ROS              GI/Hepatic/Renal: Neg GI/Hepatic/Renal ROS            Endo/Other: Negative Endo/Other ROS                    Abdominal:             Vascular: negative vascular ROS. Other Findings:           Anesthesia Plan      epidural     ASA 2             Anesthetic plan and risks discussed with patient and spouse.                         Yasmin Gale MD   8/1/2022

## 2022-08-01 NOTE — PLAN OF CARE
Problem: Vaginal Birth or  Section  Goal: Fetal and maternal status remain reassuring during the birth process  Description:  Birth OB-Pregnancy care plan goal which identifies if the fetal and maternal status remain reassuring during the birth process  Outcome: Progressing     Problem: Pain  Goal: Verbalizes/displays adequate comfort level or baseline comfort level  Outcome: Progressing     Problem: Infection - Adult  Goal: Absence of infection during hospitalization  Outcome: Progressing     Problem: Safety - Adult  Goal: Free from fall injury  Outcome: Progressing     Problem: Chronic Conditions and Co-morbidities  Goal: Patient's chronic conditions and co-morbidity symptoms are monitored and maintained or improved  Outcome: Progressing

## 2022-08-02 PROCEDURE — 1100000000 HC RM PRIVATE

## 2022-08-02 PROCEDURE — 6370000000 HC RX 637 (ALT 250 FOR IP): Performed by: OBSTETRICS & GYNECOLOGY

## 2022-08-02 RX ADMIN — IBUPROFEN 800 MG: 800 TABLET, FILM COATED ORAL at 12:15

## 2022-08-02 RX ADMIN — IBUPROFEN 800 MG: 800 TABLET, FILM COATED ORAL at 17:58

## 2022-08-02 RX ADMIN — OXYCODONE 10 MG: 5 TABLET ORAL at 05:34

## 2022-08-02 RX ADMIN — POLYETHYLENE GLYCOL 3350 17 G: 17 POWDER, FOR SOLUTION ORAL at 09:14

## 2022-08-02 RX ADMIN — IBUPROFEN 800 MG: 800 TABLET, FILM COATED ORAL at 05:34

## 2022-08-02 RX ADMIN — DOCUSATE SODIUM 100 MG: 100 CAPSULE ORAL at 09:14

## 2022-08-02 ASSESSMENT — PAIN DESCRIPTION - DESCRIPTORS: DESCRIPTORS: SORE;THROBBING

## 2022-08-02 ASSESSMENT — PAIN DESCRIPTION - LOCATION: LOCATION: PERINEUM

## 2022-08-02 ASSESSMENT — PAIN SCALES - GENERAL: PAINLEVEL_OUTOF10: 7

## 2022-08-02 ASSESSMENT — PAIN DESCRIPTION - ORIENTATION: ORIENTATION: MID

## 2022-08-02 NOTE — LACTATION NOTE
This note was copied from a baby's chart. Assisted with attempt at breast in cross cradle on L. No latch. Mom already pumping and dad is supplementing with formula. Getting drops so far. Discussed normal  behavior. May take baby a little while to figure out how to nurse well and consistently. Plan to continued trying at breast and pumping if no latch. Will follow output and weight loss. Will continue to assist with positioning and technique. Encouraged attempt at breast and follow plan.

## 2022-08-02 NOTE — PROGRESS NOTES
Progress Note                               Patient: Suly Ramos MRN: [id number]  SSN: [id number]    YOB: 1990  Age: 33 y.o. Sex: female      Postpartum Day Number  1    Subjective:     Patient doing well postpartum without significant complaints. Voiding without difficulty. Patient reports normal lochia. Pain well controlled, voiding on her own without difficulty. Denies HA, SOB/CP, RUQ pain, Vision changes. Objective:     Patient Vitals for the past 12 hrs:   Temp Pulse Resp BP SpO2   22 0723 97.8 °F (36.6 °C) 79 18 114/62 97 %   22 2343 98.9 °F (37.2 °C) 81 18 131/79 97 %   22 2059 98.7 °F (37.1 °C) -- 16 -- --       Temp (24hrs), Av.3 °F (37.4 °C), Min:97.8 °F (36.6 °C), Max:100.3 °F (37.9 °C)      Intake/Output Summary (Last 24 hours) at 2022 0836  Last data filed at 2022 3649  Gross per 24 hour   Intake --   Output 1575 ml   Net -1575 ml           Physical Exam:    Constitutional: She appears well-developed and well-nourished. No distress. HENT:    Head: Normocephalic and atraumatic.    Cardiovascular: RRR  Pulmonary/Chest: CTAB  Abd:  S/NTTP/ND, BS normoactive, fundus firm at umbilicus  Ext:  No c/c/e      Lab/Data Review:  Recent Results (from the past 72 hour(s))   CBC with Auto Differential    Collection Time: 22  7:11 PM   Result Value Ref Range    WBC 14.2 (H) 4.3 - 11.1 K/uL    RBC 3.72 (L) 4.05 - 5.2 M/uL    Hemoglobin 11.6 (L) 11.7 - 15.4 g/dL    Hematocrit 34.1 (L) 35.8 - 46.3 %    MCV 91.7 79.6 - 97.8 FL    MCH 31.2 26.1 - 32.9 PG    MCHC 34.0 31.4 - 35.0 g/dL    RDW 13.1 11.9 - 14.6 %    Platelets 854 821 - 808 K/uL    MPV 11.7 9.4 - 12.3 FL    nRBC 0.00 0.0 - 0.2 K/uL    Differential Type AUTOMATED      Seg Neutrophils 85 (H) 43 - 78 %    Lymphocytes 8 (L) 13 - 44 %    Monocytes 6 4.0 - 12.0 %    Eosinophils % 0 (L) 0.5 - 7.8 %    Basophils 0 0.0 - 2.0 %    Immature Granulocytes 1 0.0 - 5.0 %    Segs Absolute 12.1 (H) 1.7 - 8.2 K/UL    Absolute Lymph # 1.2 0.5 - 4.6 K/UL    Absolute Mono # 0.8 0.1 - 1.3 K/UL    Absolute Eos # 0.1 0.0 - 0.8 K/UL    Basophils Absolute 0.0 0.0 - 0.2 K/UL    Absolute Immature Granulocyte 0.1 0.0 - 0.5 K/UL   TYPE AND SCREEN    Collection Time: 22  7:11 PM   Result Value Ref Range    Crossmatch expiration date 2022,2359     ABO/Rh B POSITIVE     Antibody Screen NEG    POCT Blood Gas, Cord Blood    Collection Time: 22  6:53 PM   Result Value Ref Range    ph, Cord Blood, POC 7.24 7. 15 - 7.38      PCO2, Cord Blood, POC 51 32 - 68 mmHg    PO2, Cord Blood, POC 19 mmHg    HCO3, Mixed 21.8 (L) 22 - 26 MMOL/L    SO2c, Arterial, POC 22.0 (L) 95 - 98 %    BASE DEFICIT (POC) 6.0 mmol/L    Specimen type: ARTERIAL CORD      Performed by: Carson Barton    POCT Blood Gas, Cord Blood    Collection Time: 22  6:57 PM   Result Value Ref Range    ph, Cord Blood, POC 7.32 7.15 - 7.38      PCO2, Cord Blood, POC 37 32 - 68 mmHg    PO2, Cord Blood, POC 31 mmHg    HCO3, Venous 19.3 (L) 23 - 28 MMOL/L    SO2, VENOUS (POC) 54.0 (L) 65 - 88 %    BASE DEFICIT (POC) 6.1 mmol/L    Specimen type: VENOUS CORD      Performed by: Claudia Walker for the patient's :  Daya Mata [[id number]]     Lab Results   Component Value Date/Time    ABORH B NEGATIVE 2022 06:37 PM           Assessment and Plan:     33 y.o. Roger Rough ppd# 1 s/p  at 40w2d:      1) PP:  Meeting all pp goals, continue routine care   2) Breast and formula feeding, Rh pos , Rub imm   3) Shoulder Dystocia:  discussed potential future ramifications          Signed By: Saint Iha, MD     2022

## 2022-08-02 NOTE — ANESTHESIA POSTPROCEDURE EVALUATION
Department of Anesthesiology  Postprocedure Note    Patient: Lev Reid  MRN: [id number]  YOB: 1990  Date of evaluation: 8/1/2022      Procedure Summary     Date: 08/01/22 Room / Location:     Anesthesia Start: 0801 Anesthesia Stop: 1837    Procedure: Labor Analgesia Diagnosis:     Scheduled Providers:  Responsible Provider: Orlando Obando MD    Anesthesia Type: epidural ASA Status: 2          Anesthesia Type: No value filed. Callum Phase I:      Callum Phase II:        Anesthesia Post Evaluation    Patient location during evaluation: bedside  Patient participation: complete - patient participated  Level of consciousness: awake and alert  Airway patency: patent  Nausea & Vomiting: no nausea and no vomiting  Complications: no  Cardiovascular status: hemodynamically stable  Respiratory status: acceptable  Hydration status: euvolemic  Comments: Blood pressure (!) 121/58, pulse 84, temperature 98.7 °F (37.1 °C), temperature source Oral, resp. rate 16, last menstrual period 10/23/2021, SpO2 100 %, unknown if currently breastfeeding.   Multimodal analgesia pain management approach

## 2022-08-02 NOTE — LACTATION NOTE

## 2022-08-03 VITALS
OXYGEN SATURATION: 98 % | DIASTOLIC BLOOD PRESSURE: 68 MMHG | RESPIRATION RATE: 16 BRPM | SYSTOLIC BLOOD PRESSURE: 106 MMHG | HEART RATE: 72 BPM | TEMPERATURE: 98 F

## 2022-08-03 PROCEDURE — 6370000000 HC RX 637 (ALT 250 FOR IP): Performed by: OBSTETRICS & GYNECOLOGY

## 2022-08-03 RX ORDER — IBUPROFEN 800 MG/1
800 TABLET ORAL EVERY 8 HOURS PRN
Qty: 90 TABLET | Refills: 0 | Status: SHIPPED | OUTPATIENT
Start: 2022-08-03 | End: 2022-09-21 | Stop reason: ALTCHOICE

## 2022-08-03 RX ADMIN — POLYETHYLENE GLYCOL 3350 17 G: 17 POWDER, FOR SOLUTION ORAL at 09:54

## 2022-08-03 RX ADMIN — DOCUSATE SODIUM 100 MG: 100 CAPSULE ORAL at 00:05

## 2022-08-03 RX ADMIN — HYDROCORTISONE ACETATE PRAMOXINE HCL: 2.5; 1 CREAM TOPICAL at 05:58

## 2022-08-03 RX ADMIN — DOCUSATE SODIUM 100 MG: 100 CAPSULE ORAL at 09:53

## 2022-08-03 RX ADMIN — IBUPROFEN 800 MG: 800 TABLET, FILM COATED ORAL at 00:06

## 2022-08-03 RX ADMIN — Medication: at 05:58

## 2022-08-03 RX ADMIN — IBUPROFEN 800 MG: 800 TABLET, FILM COATED ORAL at 05:59

## 2022-08-03 RX ADMIN — WITCH HAZEL 40 EACH: 500 SOLUTION RECTAL; TOPICAL at 05:58

## 2022-08-03 RX ADMIN — ACETAMINOPHEN 1000 MG: 500 TABLET, FILM COATED ORAL at 09:53

## 2022-08-03 NOTE — DISCHARGE INSTRUCTIONS
After Your Delivery (the Postpartum Period): Care Instructions  Overview     Congratulations on the birth of your baby. Like pregnancy, the  period can be a time of excitement, mary ann, and exhaustion. You may look at your wondrous little baby and feel happy. You may also be overwhelmed by your new sleep hoursand new responsibilities. At first, babies often sleep during the days and are awake at night. They do not have a pattern or routine. They may make sudden gasps, jerk themselves awake, or look like they have crossed eyes. These are all normal, and they mayeven make you smile. In these first weeks after delivery, try to take good care of yourself. It may take 4 to 6 weeks to feel like yourself again, and possibly longer if you had a  birth. You will likely feel very tired for several weeks. Your dayswill be full of ups and downs, but lots of mary ann as well. Follow-up care is a key part of your treatment and safety. Be sure to make and go to all appointments, and call your doctor if you are having problems. It's also a good idea to know your test results and keep alist of the medicines you take. How can you care for yourself at home? Take care of your body after delivery  Use pads instead of tampons for the bloody flow that may last as long as 2 weeks. Ease cramps with ibuprofen (Advil, Motrin). Ease soreness of hemorrhoids and the area between your vagina and rectum with ice compresses or witch hazel pads. Ease constipation by drinking lots of fluid and eating high-fiber foods. Ask your doctor about over-the-counter stool softeners. Cleanse yourself with a gentle squeeze of warm water from a bottle instead of wiping with toilet paper. Take a sitz bath in warm water several times a day. Wear a good nursing bra. Ease sore and swollen breasts with warm, wet washcloths. If you aren't breastfeeding, use ice rather than heat for breast soreness.   Your period may not start for several months if you are breastfeeding. You may bleed more, and longer at first, than you did before you got pregnant. Wait until you are healed (about 4 to 6 weeks) before you have sex. Ask your doctor when it is okay for you to have sex. Try not to travel with your baby for 5 or 6 weeks. If you take a long car trip, make frequent stops to walk around and stretch. Avoid exhaustion  Rest every day. Try to nap when your baby naps. Ask another adult to be with you for a few days after delivery. Plan for  if you have other children. Stay flexible so you can eat at odd hours and sleep when you need to. Both you and your baby are making new schedules. Plan small trips to get out of the house. Change can make you feel less tired. Ask for help with housework, cooking, and shopping. Remind yourself that your job is to care for your baby. Know about help for postpartum depression  \"Baby blues\" are common for the first 1 to 2 weeks after birth. You may cry or feel sad or irritable for no reason. Rest whenever you can. Being tired makes it harder to handle your emotions. Go for walks with your baby. Talk to your partner, friends, and family about your feelings. If your symptoms last for more than a few weeks, or if you feel very depressed, ask your doctor for help. Postpartum depression can be treated. Support groups and counseling can help. Sometimes medicine can also help. Stay healthy  Eat healthy foods so you have more energy. If you breastfeed, avoid drugs. If you quit smoking during pregnancy, try to stay smoke-free. If you choose to have a drink now and then, have only one drink, and limit the number of occasions that you have a drink. Wait to breastfeed at least 2 hours after you have a drink to reduce the amount of alcohol the baby may get in the milk. Start daily exercise after 4 to 6 weeks, but rest when you feel tired. Learn exercises to tone your belly.  Do Kegel exercises to regain strength in your pelvic muscles. You can do these exercises while you stand or sit. Squeeze the same muscles you would use to stop your urine. Your belly and thighs should not move. Hold the squeeze for 3 seconds, and then relax for 3 seconds. Start with 3 seconds. Then add 1 second each week until you are able to squeeze for 10 seconds. Repeat the exercise 10 to 15 times for each session. Do three or more sessions each day. Find a class for you and your baby that has an exercise time. If you had a  birth, give yourself a bit more time before you exercise, and be careful. When should you call for help? Share this information with your partner, family, or a friend. They can help you watch for warning signs. Call 911 anytime you think you may need emergency care. For example, call if:    You have thoughts of harming yourself, your baby, or another person. You passed out (lost consciousness). You have chest pain, are short of breath, or cough up blood. You have a seizure. Call your doctor now or seek immediate medical care if:    You have signs of hemorrhage (too much bleeding), such as:  Heavy vaginal bleeding. This means that you are soaking through one or more pads in an hour. Or you pass blood clots bigger than an egg. Feeling dizzy or lightheaded, or you feel like you may faint. Feeling so tired or weak that you cannot do your usual activities. A fast or irregular heartbeat. New or worse belly pain. You have signs of infection, such as:  A fever. Vaginal discharge that smells bad. New or worse belly pain. You have symptoms of a blood clot in your leg (called a deep vein thrombosis), such as:  Pain in the calf, back of the knee, thigh, or groin. Redness and swelling in your leg or groin. You have signs of preeclampsia, such as:  Sudden swelling of your face, hands, or feet. New vision problems (such as dimness, blurring, or seeing spots). A severe headache.    Watch closely for changes in your health, and be sure to contact your doctor if:    Your vaginal bleeding isn't decreasing. You feel sad, anxious, or hopeless for more than a few days. You are having problems with your breasts or breastfeeding. Where can you learn more? Go to https://chberryeb.[email or web address]. org and sign in to your [employer] account. Enter R006 in the [employer] box to learn more about \"After Your Delivery (the Postpartum Period): Care Instructions. \"     If you do not have an account, please click on the \"Sign Up Now\" link. Current as of: February 23, 2022               Content Version: 13.3  © 2006-2022 Healthwise, Incorporated. Care instructions adapted under license by [facility] ([facility]). If you have questions about a medical condition or this instruction, always ask your healthcare professional. [address] any warranty or liability for your use of this information.

## 2022-08-03 NOTE — PROGRESS NOTES
Post-Partum Day Number 2 Progress/Discharge Note  Irene Vences  [id number]    Patient doing well post-partum without significant complaint. Voiding without difficulty, normal lochia, positive flatus. Vitals:  Patient Vitals for the past 24 hrs:   BP Temp Temp src Pulse Resp SpO2   22 0719 106/68 98 °F (36.7 °C) Oral 72 16 98 %   22 0108 116/70 98.2 °F (36.8 °C) Oral 87 15 98 %   22 1655 112/73 98 °F (36.7 °C) Oral 82 16 97 %     Temp (24hrs), Av.1 °F (36.7 °C), Min:98 °F (36.7 °C), Max:98.2 °F (36.8 °C)      Vital signs stable, afebrile. Exam:  Patient without distress. Abdomen soft, fundus firm at level of umbilicus, nontender              Labs: No results found for this or any previous visit (from the past 24 hour(s)). Assessment and Plan:  Patient appears to be having uncomplicated post-partum course. Continue routine perineal care and maternal education. Plan discharge for today with follow up in our office in 6 weeks.

## 2022-08-03 NOTE — CARE COORDINATION
Chart reviewed - first time parent. SW met with patient to complete initial assessment. Patient without a listed PCP. Patient states that she will use her 's PCP (cannot recall name).  provided education on [facility] [place] [facility] Visit Program.  Family was undecided on need for home visit. No referral will be made at this time. Family has this 's contact information should they decide to participate in program.    Patient given informational packet on  mood & anxiety disorders (resources/education). Family denies any additional needs from  at this time. Family has 's contact information should any needs/questions arise.     TARA Kaufman, [address] [facility]   [phone]

## 2022-08-03 NOTE — LACTATION NOTE
This note was copied from a baby's chart. Assisted with pumping. Sized down to 21mm flanges, more comfortable per mom. Baby still not latching. Mom following feeding plan and pumping. Encouraged attempt at breast and follow plan. Watch output. Call as needed. Discussed outpatient options when milk is in, or as needed. See chart for feeding plan. Paper copy given to mom.

## 2022-08-03 NOTE — LACTATION NOTE
This note was copied from a baby's chart. Individualized Feeding Plan for Breastfeeding   Lactation Services [phone]    As much as possible, hold your baby on your chest so babys bare skin is against your bare skin with a blanket covering babys back, especially 30 minutes before it is time for baby to eat. Watch for early feeding cues such as, licking lips, sucking motions, rooting, hands to mouth. Crying is a late feeding cue. Feed your baby at least 8 times in 24 hours, or more if your baby is showing feeding cues. If baby is sleepy put baby skin to skin and watch for hunger cues. To rouse baby: unwrap, undress, massage hands, feet, & back, change diaper, gently change babys position from lying to sitting. 15-20 minutes on the first breast of active breastfeeding is considered a good feeding. Good, active breastfeeding is when baby is alert, tugging the nipple, their ear may move, and you can hear swallows. Allow baby to finish the first side before changing sides. Sleeping at the breast or only brief, light sucks should not be considered a good, full breastfeed. At each feeding:  __x__1. Do Suck Practice on finger before each feeding until sucking pattern is smooth. Try using index finger. Nail down towards tongue. __x__2. Hand Express for a few minutes prior to latching to help start milk flow. __x__3. Baby needs to NURSE WELL x 15-20 minutes on at least first breast, burp and offer 2nd breast at every feeding. If no sustained latch only attempt at breast for 10 minutes. If baby does not latch on and feed well on at least one side, you should:   __x__4. Double pump for 15 minutes with breast massage and compression. Hand express for an additional 2-3 minutes per side. Pump after each feeding attempt or poor feeding, up to 8 times per day. If you are not putting baby to the breast you need to pump 8 times a day. Pump every 3 hours. __x__5.  Give baby all of the breast milk you obtain using a straight syringe or  curved syringe. If baby does NOT have enough wet and dirty diapers per day, is jaundiced/lethargic, or has significant weight loss AND you do NOT pump enough milk for each feeding (per volume listed below), formula supplementation may need to be used. Call lactation department /pediatrician if you have concerns. AVERAGE INTAKES OF COLOSTRUM BY HEALTHY  INFANTS:  Time  Day Intake (ml per feeding)  Based on 8 feedings per day. 1st 24 hrs  1 2-10 ml  24-48 hrs  2 5-15 ml  48-72 hrs  3 30+ ml (1+ oz)  72-96 hrs  4 45+ ml (1.5+oz)                          5-6       60-75 ml (2-2.5 oz)                           7         75-90 ml (2.5-3 oz)       By day 7, baby will need 75-90 ml or 2.5-3 oz at each feeding based on 8 feedings per day & babys weight. (1oz = 30ml). Total milk volume needed in 24 hours by Day 7 is 21-23 oz per day based on baby's birthweight of 8-9. The more often baby eats, the less volume they need per feeding. If baby is eating more often than the minimum of 8 times per day, they may take less per feeding. Comments: Continue to follow plan while baby is learning to breastfeed. Use formula as needed while milk is coming in. If pumping, suggest using olive oil or coconut oil on your nipples before pumping to help reduce the friction. Use feeding plan until follow up with pediatrician. Continue to attempt at the breast for most feeds. Pump every 3 hours if no latch. Give all pumped colostrum/breastmilk at each feeding. OUTPATIENT APPOINTMENT Suggested. Outpatient services are located on the 4th floor at [facility]. Check in at the 4th floor registration desk (the same one you used when you came to have your baby).   Call for questions [phone]

## 2022-08-03 NOTE — CARE COORDINATION
OB office notified of EPDS score (10) via e-mail. SW follow-up with patient. Patient denies any history of depression/anxiety. Per patient, \"In [facility], there were no screenings. \"  Patient states that she's recently moved and was working past her due date, so this caused her some stress. Patient reports that she's \"feeling better\" since delivering her son. Patient agreeable for  to call and check-in in the future. Additionally, patient was provided this 's contact information for any needs/questions.     TARA Haque, [address] [facility]   [phone]

## 2022-08-23 ENCOUNTER — FOLLOWUP TELEPHONE ENCOUNTER (OUTPATIENT)
Dept: CASE MANAGEMENT | Age: 32
End: 2022-08-23

## 2022-08-23 NOTE — TELEPHONE ENCOUNTER
Phone call to patient at [phone] due to EPDS score of 10 after delivery. Patient denies any ongoing depression/anxiety. Patient without any additional needs at this time and is agreeable for another phone call in the future.     TARA Castillo, [address] [facility]   [phone]

## 2022-08-25 ENCOUNTER — TELEPHONE (OUTPATIENT)
Dept: OBGYN CLINIC | Age: 32
End: 2022-08-25

## 2022-09-21 ENCOUNTER — POSTPARTUM VISIT (OUTPATIENT)
Dept: OBGYN CLINIC | Age: 32
End: 2022-09-21

## 2022-09-21 VITALS
SYSTOLIC BLOOD PRESSURE: 120 MMHG | BODY MASS INDEX: 21.99 KG/M2 | DIASTOLIC BLOOD PRESSURE: 70 MMHG | WEIGHT: 112 LBS | HEIGHT: 60 IN

## 2022-09-21 PROCEDURE — 99902 PR PRENATAL VISIT: CPT | Performed by: OBSTETRICS & GYNECOLOGY

## 2022-09-28 ENCOUNTER — FOLLOWUP TELEPHONE ENCOUNTER (OUTPATIENT)
Dept: CASE MANAGEMENT | Age: 32
End: 2022-09-28

## 2022-09-28 NOTE — TELEPHONE ENCOUNTER
Phone call to patient at [phone] due to EPDS score of 10 after delivery. No answer; message left requesting call back.        TARA Ignacio, [address] [facility]   [phone]

## 2022-09-30 ASSESSMENT — ENCOUNTER SYMPTOMS
EYES NEGATIVE: 1
RESPIRATORY NEGATIVE: 1
ALLERGIC/IMMUNOLOGIC NEGATIVE: 1
GASTROINTESTINAL NEGATIVE: 1

## 2022-09-30 NOTE — PROGRESS NOTES
22:  Chief Complaint   Patient presents with    Postpartum Care     22 s/p , baby boy \"Alexios\", who is supplement fed. Contraception     Pt wants to use condoms         Subjective: This 27 y.o. female presents today for a post-partum visit after vaginal delivery. Delivery Method: Vaginal delivery  Status of Baby: Disposition of baby: home. Baby is breast and supplement fed. Postpartum Contraception: condoms       Past Medical History:   Diagnosis Date    Shoulder dystocia during labor and delivery 2022     No past surgical history on file. Social History     Socioeconomic History    Marital status:      Spouse name: Not on file    Number of children: Not on file    Years of education: Not on file    Highest education level: Not on file   Occupational History    Not on file   Tobacco Use    Smoking status: Never     Passive exposure: Never    Smokeless tobacco: Never   Vaping Use    Vaping Use: Never used   Substance and Sexual Activity    Alcohol use: Never    Drug use: Never    Sexual activity: Not Currently     Partners: Male   Other Topics Concern    Not on file   Social History Narrative    Not on file     Social Determinants of Health     Financial Resource Strain: Not on file   Food Insecurity: Not on file   Transportation Needs: Not on file   Physical Activity: Not on file   Stress: Not on file   Social Connections: Not on file   Intimate Partner Violence: Not on file   Housing Stability: Not on file     /70   Ht 5' (1.524 m)   Wt 112 lb (50.8 kg)   LMP 10/23/2021   Breastfeeding Yes   BMI 21.87 kg/m²      Review of Systems   Constitutional: Negative. HENT: Negative. Eyes: Negative. Respiratory: Negative. Cardiovascular: Negative. Gastrointestinal: Negative. Endocrine: Negative. Genitourinary: Negative. Musculoskeletal: Negative. Allergic/Immunologic: Negative. Neurological: Negative. Hematological: Negative. Psychiatric/Behavioral: Negative. Physical Exam  Vitals signs reviewed. Constitutional:       General: She is not in acute distress. Appearance: Normal appearance. She is well-developed and normal weight. She is not ill-appearing, toxic-appearing or diaphoretic. HENT:      Head: Normocephalic and atraumatic. Eyes:      General: No scleral icterus. Conjunctiva/sclera: Conjunctivae normal.      Pupils: Pupils are equal, round, and reactive to light. Neck:      Musculoskeletal: Normal range of motion and neck supple. Thyroid: No thyromegaly. Vascular: No JVD. Trachea: No tracheal deviation. Cardiovascular:      Rate and Rhythm: Normal rate and regular rhythm. Heart sounds: Normal heart sounds. No murmur. No friction rub. No gallop. Pulmonary:      Effort: Pulmonary effort is normal. No respiratory distress. Breath sounds: Normal breath sounds. No stridor. No wheezing, rhonchi or rales. Chest:      Chest wall: No tenderness. Breasts:         Right: No inverted nipple, mass, nipple discharge, skin change or tenderness. Left: No inverted nipple, mass, nipple discharge, skin change or tenderness. Abdominal:      General: Bowel sounds are normal. There is no distension. Palpations: Abdomen is soft. There is no mass. Tenderness: There is no abdominal tenderness. There is no guarding or rebound. Genitourinary:     Labia:         Right: No rash, tenderness, lesion or injury. Left: No rash, tenderness, lesion or injury. Vagina: Normal. No signs of injury and foreign body. No vaginal discharge, erythema, tenderness or bleeding. Cervix: No cervical motion tenderness, discharge or friability. Uterus: Not enlarged and not tender. Adnexa:         Right: No mass, tenderness or fullness. Left: No mass, tenderness or fullness. Comments: External genitalia are normal in appearance.     Examination of urethral meatus reveals location normal and size normal.   Examination of urethra shows no abnormalities. Examination of vaginal vault reveals no abnormalities. Cervix is long and closed without palpable pathology. Uterine portion of bimanual exam reveals contour normal, shape regular, descensus absent, no nodularity, no tenderness and size 6 weeks GA. Adnexa and parametria exam reveals no masses, nontender. Visual examination of anus and perineum shows no abnormalities. Musculoskeletal: Normal range of motion. General: No tenderness. Lymphadenopathy:      Cervical: No cervical adenopathy. Upper Body:      Right upper body: No supraclavicular adenopathy. Left upper body: No supraclavicular adenopathy. Lower Body: No right inguinal adenopathy. No left inguinal adenopathy. Skin:     General: Skin is warm and dry. Coloration: Skin is not pale. Findings: No erythema or rash. Neurological:      Mental Status: She is alert and oriented to person, place, and time. Cranial Nerves: No cranial nerve deficit. Motor: No abnormal muscle tone. Coordination: Coordination normal.      Deep Tendon Reflexes: Reflexes are normal and symmetric. Psychiatric:         Behavior: Behavior normal.         Thought Content: Thought content normal.         Judgment: Judgment normal.         1. Postpartum examination following vaginal delivery      Doing well pp. Denies mastitis, reviewed sx. Denies pp depression. Declines contraception. Counseled at length. Recommend continuing prenatal vitamin when breastfeeding and especially if not using contraception or only using condoms, etc. Pt expressed understanding.

## 2022-10-11 ENCOUNTER — FOLLOWUP TELEPHONE ENCOUNTER (OUTPATIENT)
Dept: CASE MANAGEMENT | Age: 32
End: 2022-10-11

## 2022-10-11 NOTE — TELEPHONE ENCOUNTER
Phone call to patient at [phone] due to EPDS score of 10 after delivery. No answer; message left requesting call back.        TARA Song, [address] [facility] [place]   [phone]